# Patient Record
Sex: FEMALE | Race: WHITE | URBAN - METROPOLITAN AREA
[De-identification: names, ages, dates, MRNs, and addresses within clinical notes are randomized per-mention and may not be internally consistent; named-entity substitution may affect disease eponyms.]

---

## 2020-10-15 ENCOUNTER — OFFICE VISIT (OUTPATIENT)
Dept: URBAN - METROPOLITAN AREA CLINIC 13 | Facility: CLINIC | Age: 85
End: 2020-10-15
Payer: MEDICARE

## 2020-10-15 DIAGNOSIS — H35.3231 EXUDATIVE AGE-RELATED MACULAR DEGENERATION, BILATERAL, WITH ACTIVE CHOROIDAL NEOVASCULARIZATION: ICD-10-CM

## 2020-10-15 PROCEDURE — 67028 INJECTION EYE DRUG: CPT | Performed by: OPHTHALMOLOGY

## 2020-10-15 PROCEDURE — 92134 CPTRZ OPH DX IMG PST SGM RTA: CPT | Performed by: OPHTHALMOLOGY

## 2020-10-15 ASSESSMENT — INTRAOCULAR PRESSURE
OD: 13
OS: 11

## 2020-10-15 NOTE — IMPRESSION/PLAN
Impression: Exudative age-related macular degeneration, left eye, with active choroidal neovascularization: H35.3221 OS. Plan: She feels her vision is stable and has persistent SRF OS today. She has had persistent fluid with monthly eylea and lucentis. OCT confirms PED/SRF OS. I recommend treatment to reduce the risk of vision loss in her better eye. Lucentis injected OS without complication after discussing the R/LEEANNA/ in detail. 
thanks Ivan Martinez RTC 4-6 weeks OCT OS; poss lucentis OS

## 2020-10-15 NOTE — IMPRESSION/PLAN
Impression: Exudative age-related macular degeneration of right eye w/ inactive scar: H38.2478. Plan: The patient has a disciform scar. I recommend observation as there is currently no effective treatment for subretinal scarring from a choroidal neovascular membrane (CNVM). The patient understands that further treatment would only be indicated to prevent enlargement of the central scotoma. I recommend the patient consider low vision aids, and we discussed the importance of yearly dilated eye exams.

## 2020-10-15 NOTE — IMPRESSION/PLAN
Impression: Lattice degeneration of retina, left eye: H35.412. Plan: At this time, no retinal tear or retinal detachment is identified. Retinal detachment symptoms were reviewed. Patient was encouraged to call our office if there is an increase in floaters, decrease in vision, or a shadow or curtain is noted in their peripheral vision.

## 2020-10-15 NOTE — IMPRESSION/PLAN
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 Plan: The patient has a Posterior Vitreous Detachment (PVD). At this time, no retinal tear or retinal detachment is identified. We discussed the natural history of a PVD. Retinal detachment symptoms were reviewed. Patient was encouraged to call our office if there is an increase in floaters, decrease in vision, or a shadow or curtain is noted in their peripheral vision.

## 2020-11-19 ENCOUNTER — OFFICE VISIT (OUTPATIENT)
Dept: URBAN - METROPOLITAN AREA CLINIC 13 | Facility: CLINIC | Age: 85
End: 2020-11-19
Payer: MEDICARE

## 2020-11-19 DIAGNOSIS — H35.3213 EXUDATIVE AGE-RELATED MACULAR DEGENERATION OF RIGHT EYE W/ INACTIVE SCAR: ICD-10-CM

## 2020-11-19 DIAGNOSIS — H35.412 LATTICE DEGENERATION OF RETINA, LEFT EYE: ICD-10-CM

## 2020-11-19 PROCEDURE — 92012 INTRM OPH EXAM EST PATIENT: CPT | Performed by: OPHTHALMOLOGY

## 2020-11-19 PROCEDURE — 67028 INJECTION EYE DRUG: CPT | Performed by: OPHTHALMOLOGY

## 2020-11-19 PROCEDURE — 92134 CPTRZ OPH DX IMG PST SGM RTA: CPT | Performed by: OPHTHALMOLOGY

## 2020-11-19 ASSESSMENT — INTRAOCULAR PRESSURE
OD: 14
OS: 14

## 2020-11-19 NOTE — IMPRESSION/PLAN
Impression: Exudative age-related macular degeneration, left eye, with active choroidal neovascularization: H35.3221 OS. Plan: She complains of blurry vision OD and has a large macular scar OD. She has persistent SRF OS today despite monthly eylea and lucentis. OCT confirms PED/SRF OS. Based on today's findings, I recommend treatment to reduce the risk of vision loss in her better eye. Lucentis injected OS without complication after discussing the R/LEEANNA/ in detail. 
thanks Ivan JACKSON 4-6 weeks OCT OS; lucentis OS

## 2020-11-19 NOTE — IMPRESSION/PLAN
Impression: Exudative age-related macular degeneration of right eye w/ inactive scar: H35.6550. Plan: The patient has a disciform scar. I recommend observation as there is currently no effective treatment for subretinal scarring from a choroidal neovascular membrane (CNVM). The patient understands that further treatment would only be indicated to prevent enlargement of the central scotoma. I recommend the patient consider low vision aids, and we discussed the importance of yearly dilated eye exams.

## 2020-12-17 ENCOUNTER — PROCEDURE (OUTPATIENT)
Dept: URBAN - METROPOLITAN AREA CLINIC 13 | Facility: CLINIC | Age: 85
End: 2020-12-17
Payer: MEDICARE

## 2020-12-17 PROCEDURE — 67028 INJECTION EYE DRUG: CPT | Performed by: OPHTHALMOLOGY

## 2020-12-17 PROCEDURE — 92134 CPTRZ OPH DX IMG PST SGM RTA: CPT | Performed by: OPHTHALMOLOGY

## 2020-12-17 ASSESSMENT — INTRAOCULAR PRESSURE
OS: 9
OD: 10

## 2021-01-21 ENCOUNTER — PROCEDURE (OUTPATIENT)
Dept: URBAN - METROPOLITAN AREA CLINIC 13 | Facility: CLINIC | Age: 86
End: 2021-01-21
Payer: MEDICARE

## 2021-01-21 PROCEDURE — 92134 CPTRZ OPH DX IMG PST SGM RTA: CPT | Performed by: OPHTHALMOLOGY

## 2021-01-21 PROCEDURE — 67028 INJECTION EYE DRUG: CPT | Performed by: OPHTHALMOLOGY

## 2021-01-21 ASSESSMENT — INTRAOCULAR PRESSURE
OS: 12
OD: 11

## 2021-03-04 ENCOUNTER — OFFICE VISIT (OUTPATIENT)
Dept: URBAN - METROPOLITAN AREA CLINIC 13 | Facility: CLINIC | Age: 86
End: 2021-03-04
Payer: MEDICARE

## 2021-03-04 PROCEDURE — 67028 INJECTION EYE DRUG: CPT | Performed by: OPHTHALMOLOGY

## 2021-03-04 PROCEDURE — 92134 CPTRZ OPH DX IMG PST SGM RTA: CPT | Performed by: OPHTHALMOLOGY

## 2021-03-04 PROCEDURE — 99213 OFFICE O/P EST LOW 20 MIN: CPT | Performed by: OPHTHALMOLOGY

## 2021-03-04 ASSESSMENT — INTRAOCULAR PRESSURE
OS: 11
OD: 13

## 2021-03-04 NOTE — IMPRESSION/PLAN
Impression: Exudative age-related macular degeneration of right eye w/ inactive scar: H32.8937. Plan: The patient has a disciform scar. I recommend observation as there is currently no effective treatment for subretinal scarring from a choroidal neovascular membrane (CNVM). The patient understands that further treatment would only be indicated to prevent enlargement of the central scotoma. I recommend the patient consider low vision aids, and we discussed the importance of yearly dilated eye exams.

## 2021-03-04 NOTE — IMPRESSION/PLAN
Impression: Exudative age-related macular degeneration, left eye, with active choroidal neovascularization: H35.3221 OS. Plan: She complains of fluctuations in her vision OU. She has persistent SRF OS today despite monthly eylea and lucentis. OCT confirms PED/SRF OS. Based on today's findings, I recommend treatment to reduce the risk of vision loss in her better eye. Lucentis injected OS without complication after discussing the R/LEEANNA/ in detail. She will call us with any new changes. thanks [employer] [facility] 4-6 weeks OCT OS; lucentis OS

## 2021-04-15 ENCOUNTER — PROCEDURE (OUTPATIENT)
Dept: URBAN - METROPOLITAN AREA CLINIC 13 | Facility: CLINIC | Age: 86
End: 2021-04-15
Payer: MEDICARE

## 2021-04-15 PROCEDURE — 67028 INJECTION EYE DRUG: CPT | Performed by: OPHTHALMOLOGY

## 2021-04-15 PROCEDURE — 92134 CPTRZ OPH DX IMG PST SGM RTA: CPT | Performed by: OPHTHALMOLOGY

## 2021-04-15 ASSESSMENT — INTRAOCULAR PRESSURE
OS: 13
OD: 18

## 2021-05-20 ENCOUNTER — PROCEDURE (OUTPATIENT)
Dept: URBAN - METROPOLITAN AREA CLINIC 13 | Facility: CLINIC | Age: 86
End: 2021-05-20
Payer: MEDICARE

## 2021-05-20 PROCEDURE — 67028 INJECTION EYE DRUG: CPT | Performed by: OPHTHALMOLOGY

## 2021-05-20 PROCEDURE — 92134 CPTRZ OPH DX IMG PST SGM RTA: CPT | Performed by: OPHTHALMOLOGY

## 2021-05-20 ASSESSMENT — INTRAOCULAR PRESSURE
OS: 11
OD: 10

## 2021-07-02 ENCOUNTER — OFFICE VISIT (OUTPATIENT)
Dept: URBAN - METROPOLITAN AREA CLINIC 13 | Facility: CLINIC | Age: 86
End: 2021-07-02
Payer: MEDICARE

## 2021-07-02 PROCEDURE — 67028 INJECTION EYE DRUG: CPT | Performed by: OPHTHALMOLOGY

## 2021-07-02 PROCEDURE — 92134 CPTRZ OPH DX IMG PST SGM RTA: CPT | Performed by: OPHTHALMOLOGY

## 2021-07-02 PROCEDURE — 99213 OFFICE O/P EST LOW 20 MIN: CPT | Performed by: OPHTHALMOLOGY

## 2021-07-02 ASSESSMENT — INTRAOCULAR PRESSURE
OD: 14
OS: 15

## 2021-07-02 NOTE — IMPRESSION/PLAN
Impression: Exudative age-related macular degeneration, left eye, with active choroidal neovascularization: H35.3221 OS. Plan: She complains of her vision OS fluctuating. However, she's doing as well as possible. We discussed the findings and natural history. She has persistent SRF OS today despite monthly eylea and lucentis. OCT confirms PED/SRF OS. Based on today's findings, I recommend treatment to reduce the risk of vision loss in her better eye. Lucentis injected OS without complication after discussing the R/LEEANNA/ in detail. 
thanks Ivan Martinez RTC 4-6 weeks OCT OS; lucentis OS

## 2021-07-02 NOTE — IMPRESSION/PLAN
Impression: Exudative age-related macular degeneration of right eye w/ inactive scar: H33.1310. Plan: The patient has a disciform scar. I recommend observation as there is currently no effective treatment for subretinal scarring from a choroidal neovascular membrane (CNVM). The patient understands that further treatment would only be indicated to prevent enlargement of the central scotoma. I recommend the patient consider low vision aids, and we discussed the importance of yearly dilated eye exams.

## 2021-08-13 ENCOUNTER — PROCEDURE (OUTPATIENT)
Dept: URBAN - METROPOLITAN AREA CLINIC 13 | Facility: CLINIC | Age: 86
End: 2021-08-13
Payer: MEDICARE

## 2021-08-13 PROCEDURE — 92134 CPTRZ OPH DX IMG PST SGM RTA: CPT | Performed by: OPHTHALMOLOGY

## 2021-08-13 PROCEDURE — 67028 INJECTION EYE DRUG: CPT | Performed by: OPHTHALMOLOGY

## 2021-08-13 ASSESSMENT — INTRAOCULAR PRESSURE
OS: 12
OD: 12

## 2021-09-24 ENCOUNTER — PROCEDURE (OUTPATIENT)
Dept: URBAN - METROPOLITAN AREA CLINIC 13 | Facility: CLINIC | Age: 86
End: 2021-09-24
Payer: MEDICARE

## 2021-09-24 PROCEDURE — 67028 INJECTION EYE DRUG: CPT | Performed by: OPHTHALMOLOGY

## 2021-09-24 PROCEDURE — 92134 CPTRZ OPH DX IMG PST SGM RTA: CPT | Performed by: OPHTHALMOLOGY

## 2021-09-24 ASSESSMENT — INTRAOCULAR PRESSURE
OS: 15
OD: 18

## 2021-11-05 ENCOUNTER — OFFICE VISIT (OUTPATIENT)
Dept: URBAN - METROPOLITAN AREA CLINIC 13 | Facility: CLINIC | Age: 86
End: 2021-11-05
Payer: MEDICARE

## 2021-11-05 DIAGNOSIS — H43.813 BILATERAL VITREOUS DEGENERATION OF EYES: ICD-10-CM

## 2021-11-05 PROCEDURE — 92134 CPTRZ OPH DX IMG PST SGM RTA: CPT | Performed by: OPHTHALMOLOGY

## 2021-11-05 PROCEDURE — 99213 OFFICE O/P EST LOW 20 MIN: CPT | Performed by: OPHTHALMOLOGY

## 2021-11-05 PROCEDURE — 67028 INJECTION EYE DRUG: CPT | Performed by: OPHTHALMOLOGY

## 2021-11-05 ASSESSMENT — INTRAOCULAR PRESSURE
OS: 13
OD: 12

## 2021-11-05 NOTE — IMPRESSION/PLAN
Impression: Exudative age-related macular degeneration of right eye w/ inactive scar: H33.8835. Plan: The patient has a disciform scar. I recommend observation as there is currently no effective treatment for subretinal scarring from a choroidal neovascular membrane (CNVM). The patient understands that further treatment would only be indicated to prevent enlargement of the central scotoma. I recommend the patient consider low vision aids, and we discussed the importance of yearly dilated eye exams.

## 2021-11-05 NOTE — IMPRESSION/PLAN
Impression: Exudative age-related macular degeneration, left eye, with active choroidal neovascularization: H35.3221 OS. Plan: She continues to do well with nearly resolved SRF OS. We discussed the findings and natural history. She has had persistent SRF OS today despite monthly eylea and lucentis. OCT confirms PED/trace SRF OS. Based on today's findings, I recommend treatment to reduce the risk of vision loss in her better eye. Lucentis injected OS without complication after discussing the R/LEEANNA/ in detail. She will call with any new visual changes. thanks [employer] RTC 4-6 weeks OCT OS; lucentis OS

## 2021-12-17 ENCOUNTER — PROCEDURE (OUTPATIENT)
Dept: URBAN - METROPOLITAN AREA CLINIC 13 | Facility: CLINIC | Age: 86
End: 2021-12-17
Payer: MEDICARE

## 2021-12-17 PROCEDURE — 92134 CPTRZ OPH DX IMG PST SGM RTA: CPT | Performed by: OPHTHALMOLOGY

## 2021-12-17 PROCEDURE — 67028 INJECTION EYE DRUG: CPT | Performed by: OPHTHALMOLOGY

## 2021-12-17 ASSESSMENT — INTRAOCULAR PRESSURE
OD: 9
OS: 10

## 2022-01-21 ENCOUNTER — OFFICE VISIT (OUTPATIENT)
Dept: URBAN - METROPOLITAN AREA CLINIC 13 | Facility: CLINIC | Age: 87
End: 2022-01-21
Payer: MEDICARE

## 2022-01-21 PROCEDURE — 67028 INJECTION EYE DRUG: CPT | Performed by: OPHTHALMOLOGY

## 2022-01-21 PROCEDURE — 92134 CPTRZ OPH DX IMG PST SGM RTA: CPT | Performed by: OPHTHALMOLOGY

## 2022-01-21 ASSESSMENT — INTRAOCULAR PRESSURE
OS: 10
OD: 12

## 2022-03-04 ENCOUNTER — OFFICE VISIT (OUTPATIENT)
Dept: URBAN - METROPOLITAN AREA CLINIC 13 | Facility: CLINIC | Age: 87
End: 2022-03-04
Payer: MEDICARE

## 2022-03-04 DIAGNOSIS — H35.3221 EXUDATIVE AGE-RELATED MACULAR DEGENERATION, LEFT EYE, WITH ACTIVE CHOROIDAL NEOVASCULARIZATION: Primary | ICD-10-CM

## 2022-03-04 PROCEDURE — 92134 CPTRZ OPH DX IMG PST SGM RTA: CPT | Performed by: OPHTHALMOLOGY

## 2022-03-04 PROCEDURE — 67028 INJECTION EYE DRUG: CPT | Performed by: OPHTHALMOLOGY

## 2022-03-04 PROCEDURE — 99213 OFFICE O/P EST LOW 20 MIN: CPT | Performed by: OPHTHALMOLOGY

## 2022-03-04 ASSESSMENT — INTRAOCULAR PRESSURE
OS: 10
OD: 13

## 2022-03-04 NOTE — IMPRESSION/PLAN
Impression: Exudative age-related macular degeneration, left eye, with active choroidal neovascularization: H35.3221 OS. Plan: She complains of poor vision OD but has subfoveal scarring. Her left eye continues to do well with resolved SRF OS. We discussed the findings and natural history. OCT confirms PED OS. Based on today's findings, I recommend treatment to reduce the risk of vision loss in her better eye. Lucentis injected OS without complication after discussing the R/LEEANNA/ in detail. She will call with any new visual changes. thanks [employer] [facility] 6 weeks OCT OS; lucentis OS

## 2022-03-04 NOTE — IMPRESSION/PLAN
Impression: Exudative age-related macular degeneration of right eye w/ inactive scar: H33.7949. Plan: The patient has a disciform scar. I recommend observation as there is currently no effective treatment for subretinal scarring from a choroidal neovascular membrane (CNVM). The patient understands that further treatment would only be indicated to prevent enlargement of the central scotoma. I recommend the patient consider low vision aids, and we discussed the importance of yearly dilated eye exams.

## 2022-04-15 ENCOUNTER — PROCEDURE (OUTPATIENT)
Dept: URBAN - METROPOLITAN AREA CLINIC 13 | Facility: CLINIC | Age: 87
End: 2022-04-15
Payer: MEDICARE

## 2022-04-15 DIAGNOSIS — H35.3221 EXUDATIVE AGE-RELATED MACULAR DEGENERATION, LEFT EYE, WITH ACTIVE CHOROIDAL NEOVASCULARIZATION: Primary | ICD-10-CM

## 2022-04-15 PROCEDURE — 92134 CPTRZ OPH DX IMG PST SGM RTA: CPT | Performed by: OPHTHALMOLOGY

## 2022-04-15 PROCEDURE — 67028 INJECTION EYE DRUG: CPT | Performed by: OPHTHALMOLOGY

## 2022-05-20 ENCOUNTER — PROCEDURE (OUTPATIENT)
Dept: URBAN - METROPOLITAN AREA CLINIC 13 | Facility: CLINIC | Age: 87
End: 2022-05-20
Payer: MEDICARE

## 2022-05-20 DIAGNOSIS — H35.3221 EXUDATIVE AGE-RELATED MACULAR DEGENERATION, LEFT EYE, WITH ACTIVE CHOROIDAL NEOVASCULARIZATION: Primary | ICD-10-CM

## 2022-05-20 PROCEDURE — 67028 INJECTION EYE DRUG: CPT | Performed by: OPHTHALMOLOGY

## 2022-05-20 PROCEDURE — 92134 CPTRZ OPH DX IMG PST SGM RTA: CPT | Performed by: OPHTHALMOLOGY

## 2022-05-20 ASSESSMENT — INTRAOCULAR PRESSURE
OS: 13
OD: 16

## 2022-08-19 ENCOUNTER — OFFICE VISIT (OUTPATIENT)
Dept: URBAN - METROPOLITAN AREA CLINIC 13 | Facility: CLINIC | Age: 87
End: 2022-08-19
Payer: MEDICARE

## 2022-08-19 DIAGNOSIS — H35.3213 EXUDATIVE AGE-RELATED MACULAR DEGENERATION OF RIGHT EYE W/ INACTIVE SCAR: ICD-10-CM

## 2022-08-19 DIAGNOSIS — H35.3221 EXUDATIVE AGE-RELATED MACULAR DEGENERATION, LEFT EYE, WITH ACTIVE CHOROIDAL NEOVASCULARIZATION: Primary | ICD-10-CM

## 2022-08-19 PROCEDURE — 99213 OFFICE O/P EST LOW 20 MIN: CPT | Performed by: OPHTHALMOLOGY

## 2022-08-19 PROCEDURE — 67028 INJECTION EYE DRUG: CPT | Performed by: OPHTHALMOLOGY

## 2022-08-19 PROCEDURE — 92134 CPTRZ OPH DX IMG PST SGM RTA: CPT | Performed by: OPHTHALMOLOGY

## 2022-08-19 ASSESSMENT — INTRAOCULAR PRESSURE
OS: 12
OD: 12

## 2022-08-19 NOTE — IMPRESSION/PLAN
Impression: Exudative age-related macular degeneration of right eye w/ inactive scar: H39.7516. Plan: The patient has a disciform scar. I recommend observation as there is currently no effective treatment for subretinal scarring from a choroidal neovascular membrane (CNVM). The patient understands that further treatment would only be indicated to prevent enlargement of the central scotoma. I recommend the patient consider low vision aids, and we discussed the importance of yearly dilated eye exams.

## 2022-08-19 NOTE — IMPRESSION/PLAN
Impression: Exudative age-related macular degeneration, left eye, with active choroidal neovascularization: H35.3227 OS. Plan: She was lost to f/u and returns doing well after 13 weeks and complains of blurry vision OD. Her left eye continues to do well with resolved SRF OS. We discussed the findings and natural history. OCT confirms PED OS. Based on today's findings, I recommend treatment to reduce the risk of vision loss in her better eye. Lucentis injected OS without complication after discussing the R/LEEANNA/ in detail. She will call with any new visual changes. thanks [employer] [facility] 10-11 weeks OCT OS; lucentis OS

## 2022-11-04 ENCOUNTER — OFFICE VISIT (OUTPATIENT)
Dept: URBAN - METROPOLITAN AREA CLINIC 13 | Facility: CLINIC | Age: 87
End: 2022-11-04
Payer: MEDICARE

## 2022-11-04 DIAGNOSIS — H35.3221 EXUDATIVE AGE-RELATED MACULAR DEGENERATION, LEFT EYE, WITH ACTIVE CHOROIDAL NEOVASCULARIZATION: Primary | ICD-10-CM

## 2022-11-04 PROCEDURE — 92134 CPTRZ OPH DX IMG PST SGM RTA: CPT | Performed by: OPHTHALMOLOGY

## 2022-11-04 PROCEDURE — 67028 INJECTION EYE DRUG: CPT | Performed by: OPHTHALMOLOGY

## 2022-11-04 ASSESSMENT — INTRAOCULAR PRESSURE
OD: 16
OS: 15

## 2023-01-20 ENCOUNTER — OFFICE VISIT (OUTPATIENT)
Dept: URBAN - METROPOLITAN AREA CLINIC 13 | Facility: CLINIC | Age: 88
End: 2023-01-20
Payer: MEDICARE

## 2023-01-20 DIAGNOSIS — H35.3213 EXUDATIVE AGE-RELATED MACULAR DEGENERATION OF RIGHT EYE W/ INACTIVE SCAR: ICD-10-CM

## 2023-01-20 DIAGNOSIS — H35.3221 EXUDATIVE AGE-RELATED MACULAR DEGENERATION, LEFT EYE, WITH ACTIVE CHOROIDAL NEOVASCULARIZATION: Primary | ICD-10-CM

## 2023-01-20 PROCEDURE — 92134 CPTRZ OPH DX IMG PST SGM RTA: CPT | Performed by: OPHTHALMOLOGY

## 2023-01-20 PROCEDURE — 67028 INJECTION EYE DRUG: CPT | Performed by: OPHTHALMOLOGY

## 2023-01-20 ASSESSMENT — INTRAOCULAR PRESSURE
OD: 16
OS: 15

## 2023-01-20 NOTE — IMPRESSION/PLAN
Impression: Exudative age-related macular degeneration of right eye w/ inactive scar: H33.4948. Plan: The patient has a disciform scar. I recommend observation as there is currently no effective treatment for subretinal scarring from a choroidal neovascular membrane (CNVM). The patient understands that further treatment would only be indicated to prevent enlargement of the central scotoma. I recommend the patient consider low vision aids, and we discussed the importance of yearly dilated eye exams.

## 2023-01-20 NOTE — IMPRESSION/PLAN
Impression: Exudative age-related macular degeneration, left eye, with active choroidal neovascularization: H35.3221 OS. Plan: She feels her vision is unchanged. Her left eye continues to do well with resolved SRF OS. We discussed the findings and natural history. OCT confirms PED OS. I recommend treatment to reduce the risk of vision loss in her better eye. Lucentis injected OS without complication after discussing the R/LEEANNA/ in detail. She will call with any new visual changes. thanks [employer] [facility] 10-12 weeks OCT OS; poss lucentis OS

## 2023-03-31 ENCOUNTER — OFFICE VISIT (OUTPATIENT)
Dept: URBAN - METROPOLITAN AREA CLINIC 13 | Facility: CLINIC | Age: 88
End: 2023-03-31
Payer: MEDICARE

## 2023-03-31 DIAGNOSIS — H35.3213 EXUDATIVE AGE-RELATED MACULAR DEGENERATION OF RIGHT EYE W/ INACTIVE SCAR: ICD-10-CM

## 2023-03-31 DIAGNOSIS — H35.3221 EXUDATIVE AGE-RELATED MACULAR DEGENERATION, LEFT EYE, WITH ACTIVE CHOROIDAL NEOVASCULARIZATION: Primary | ICD-10-CM

## 2023-03-31 PROCEDURE — 92134 CPTRZ OPH DX IMG PST SGM RTA: CPT | Performed by: OPHTHALMOLOGY

## 2023-03-31 PROCEDURE — 99213 OFFICE O/P EST LOW 20 MIN: CPT | Performed by: OPHTHALMOLOGY

## 2023-03-31 PROCEDURE — 67028 INJECTION EYE DRUG: CPT | Performed by: OPHTHALMOLOGY

## 2023-03-31 ASSESSMENT — INTRAOCULAR PRESSURE
OS: 10
OD: 9

## 2023-03-31 NOTE — IMPRESSION/PLAN
Impression: Exudative age-related macular degeneration of right eye w/ inactive scar: H33.2116. Plan: The patient has a disciform scar. I recommend observation as there is currently no effective treatment for subretinal scarring from a choroidal neovascular membrane (CNVM). The patient understands that further treatment would only be indicated to prevent enlargement of the central scotoma. I recommend the patient consider low vision aids, and we discussed the importance of yearly dilated eye exams.

## 2023-03-31 NOTE — IMPRESSION/PLAN
Impression: Exudative age-related macular degeneration, left eye, with active choroidal neovascularization: H35.3221 OS. Plan: She complains of blurry vision OD and has macular scarring OD limiting the vision. Her left eye continues to do well with resolved SRF OS. We discussed the findings and natural history. OCT confirms PED OU. Based on today's findings, I recommend treatment to reduce the risk of vision loss in her better eye. Lucentis injected OS without complication after discussing the R/LEEANNA/ in detail. She will call with any new visual changes. thanks [employer] [facility] 10-12 weeks OCT OS; lucentis OS

## 2023-06-16 ENCOUNTER — PROCEDURE (OUTPATIENT)
Dept: URBAN - METROPOLITAN AREA CLINIC 13 | Facility: CLINIC | Age: 88
End: 2023-06-16
Payer: MEDICARE

## 2023-06-16 DIAGNOSIS — H35.3221 EXUDATIVE AGE-RELATED MACULAR DEGENERATION, LEFT EYE, WITH ACTIVE CHOROIDAL NEOVASCULARIZATION: Primary | ICD-10-CM

## 2023-06-16 PROCEDURE — 92134 CPTRZ OPH DX IMG PST SGM RTA: CPT | Performed by: OPHTHALMOLOGY

## 2023-06-16 PROCEDURE — 67028 INJECTION EYE DRUG: CPT | Performed by: OPHTHALMOLOGY

## 2023-06-16 ASSESSMENT — INTRAOCULAR PRESSURE
OD: 11
OS: 12

## 2023-09-15 ENCOUNTER — OFFICE VISIT (OUTPATIENT)
Dept: URBAN - METROPOLITAN AREA CLINIC 13 | Facility: CLINIC | Age: 88
End: 2023-09-15
Payer: MEDICARE

## 2023-09-15 DIAGNOSIS — H35.3213 EXUDATIVE AGE-RELATED MACULAR DEGENERATION OF RIGHT EYE W/ INACTIVE SCAR: ICD-10-CM

## 2023-09-15 DIAGNOSIS — H35.3221 EXUDATIVE AGE-RELATED MACULAR DEGENERATION, LEFT EYE, WITH ACTIVE CHOROIDAL NEOVASCULARIZATION: Primary | ICD-10-CM

## 2023-09-15 PROCEDURE — 92134 CPTRZ OPH DX IMG PST SGM RTA: CPT | Performed by: OPHTHALMOLOGY

## 2023-09-15 PROCEDURE — 67028 INJECTION EYE DRUG: CPT | Performed by: OPHTHALMOLOGY

## 2023-09-15 ASSESSMENT — INTRAOCULAR PRESSURE
OD: 15
OS: 13